# Patient Record
Sex: MALE | Race: OTHER | HISPANIC OR LATINO | URBAN - METROPOLITAN AREA
[De-identification: names, ages, dates, MRNs, and addresses within clinical notes are randomized per-mention and may not be internally consistent; named-entity substitution may affect disease eponyms.]

---

## 2023-06-07 ENCOUNTER — EMERGENCY (EMERGENCY)
Facility: HOSPITAL | Age: 28
LOS: 0 days | Discharge: HOME | End: 2023-06-07
Attending: EMERGENCY MEDICINE
Payer: MEDICAID

## 2023-06-07 VITALS
SYSTOLIC BLOOD PRESSURE: 130 MMHG | RESPIRATION RATE: 18 BRPM | DIASTOLIC BLOOD PRESSURE: 76 MMHG | HEART RATE: 58 BPM | WEIGHT: 179.9 LBS | OXYGEN SATURATION: 99 % | HEIGHT: 69 IN | TEMPERATURE: 99 F
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
## 2023-06-07 DIAGNOSIS — T16.1XXA FOREIGN BODY IN RIGHT EAR, INITIAL ENCOUNTER: ICD-10-CM
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
## 2023-06-07 PROCEDURE — 99283 EMERGENCY DEPT VISIT LOW MDM: CPT | Mod: 25

## 2023-06-07 PROCEDURE — 69200 CLEAR OUTER EAR CANAL: CPT | Mod: RT

## 2023-06-07 PROCEDURE — 69200 CLEAR OUTER EAR CANAL: CPT

## 2023-06-07 PROCEDURE — 99282 EMERGENCY DEPT VISIT SF MDM: CPT | Mod: 25

## 2023-06-07 NOTE — ED ADULT NURSE NOTE - NSFALLUNIVINTERV_ED_ALL_ED
Bed/Stretcher in lowest position, wheels locked, appropriate side rails in place/Call bell, personal items and telephone in reach/Instruct patient to call for assistance before getting out of bed/chair/stretcher/Non-slip footwear applied when patient is off stretcher/[place] to call system/Physically safe environment - no spills, clutter or unnecessary equipment/Purposeful proactive rounding/Room/bathroom lighting operational, light cord in reach

## 2023-06-07 NOTE — ED STATDOCS - NSFOLLOWUPINSTRUCTIONS_ED_ALL_ED_FT
Do not place anything in the ear.   Take motrin or tylenol for pain.     Return to the Emergency Department for worsening or persistent symptoms, and/or ANY NEW OR CONCERNING SYMPTOMS. If you have issues obtaining follow up, please call: [phone] or [phone]  to obtain a doctor or specialist who takes your insurance in your area.

## 2023-06-07 NOTE — ED STATDOCS - PATIENT PORTAL LINK FT
You can access the FollowMyHealth Patient Portal offered by [facility] by registering at the following website: [email or web address]. By joining [employer]’s FollowMyHealth portal, you will also be able to view your health information using other applications (apps) compatible with our system.

## 2023-06-07 NOTE — ED STATDOCS - PHYSICAL EXAMINATION
Gen: Well appearing in NAD  Head: NC/AT, +Bug in R ear canal  Neck: trachea midline  Resp:  No distress  Ext: no deformities  Neuro:  A&O appears non focal  Skin:  Warm and dry as visualized  Psych:  Normal affect and mood

## 2023-06-07 NOTE — ED STATDOCS - PROGRESS NOTE DETAILS
26 yo male presents with R ear pain since this morning. Pt thinks it was a bug that went inside and tried to use a paper clip to remove it without relief.   Irrigation, suction and alligator forceps used to attempt to remove the bug with success.   Will d/c home. -Julien Flores PA-C

## 2023-06-07 NOTE — ED STATDOCS - NS ED ATTENDING STATEMENT MOD
This was a shared visit with the [facility]. I reviewed and verified the documentation and independently performed the documented:

## 2023-06-07 NOTE — ED STATDOCS - OBJECTIVE STATEMENT
28 y/o male w// no pertinent PMHx presents to the ED c/o a bug in his R ear since this morning. Pt states he tried to get it out with a paper clip PTA.

## 2024-06-11 ENCOUNTER — EMERGENCY (EMERGENCY)
Facility: HOSPITAL | Age: 29
LOS: 0 days | Discharge: HOME | End: 2024-06-11
Attending: EMERGENCY MEDICINE
Payer: MEDICAID

## 2024-06-11 VITALS
RESPIRATION RATE: 16 BRPM | TEMPERATURE: 98 F | SYSTOLIC BLOOD PRESSURE: 137 MMHG | HEART RATE: 59 BPM | DIASTOLIC BLOOD PRESSURE: 63 MMHG | OXYGEN SATURATION: 100 %

## 2024-06-11 VITALS — WEIGHT: 213.63 LBS

## 2024-06-11 DIAGNOSIS — M25.529 PAIN IN UNSPECIFIED ELBOW: ICD-10-CM

## 2024-06-11 DIAGNOSIS — S09.90XA UNSPECIFIED INJURY OF HEAD, INITIAL ENCOUNTER: ICD-10-CM

## 2024-06-11 DIAGNOSIS — M54.2 CERVICALGIA: ICD-10-CM
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
## 2024-06-11 DIAGNOSIS — R11.2 NAUSEA WITH VOMITING, UNSPECIFIED: ICD-10-CM

## 2024-06-11 DIAGNOSIS — S00.01XA ABRASION OF SCALP, INITIAL ENCOUNTER: ICD-10-CM
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
## 2024-06-11 PROCEDURE — 99284 EMERGENCY DEPT VISIT MOD MDM: CPT

## 2024-06-11 PROCEDURE — 99284 EMERGENCY DEPT VISIT MOD MDM: CPT | Mod: 25

## 2024-06-11 PROCEDURE — 70450 CT HEAD/BRAIN W/O DYE: CPT | Mod: MC

## 2024-06-11 PROCEDURE — 70450 CT HEAD/BRAIN W/O DYE: CPT | Mod: 26,MC

## 2024-06-11 PROCEDURE — 72125 CT NECK SPINE W/O DYE: CPT | Mod: 26,MC

## 2024-06-11 PROCEDURE — 72125 CT NECK SPINE W/O DYE: CPT | Mod: MC

## 2024-06-11 RX ORDER — ACETAMINOPHEN 500 MG
1000 TABLET ORAL ONCE
Refills: 0 | Status: COMPLETED | OUTPATIENT
Start: 2024-06-11 | End: 2024-06-11

## 2024-06-11 RX ADMIN — Medication 1000 MILLIGRAM(S): at 21:01

## 2024-06-11 NOTE — ED STATDOCS - CLINICAL SUMMARY MEDICAL DECISION MAKING FREE TEXT BOX
Patient with fall from ladder with head and neck pain will CT head rule out bleed will CT neck rule out fracture pain control reassess.  Will update tetanus. Patient with fall from ladder with head and neck pain will CT head rule out bleed will CT neck rule out fracture pain control reassess.  Will update tetanus.    Progress note 11:10 PM CT without acute findings okay for discharge.  Pain improved.

## 2024-06-11 NOTE — ED ADULT TRIAGE NOTE - CHIEF COMPLAINT QUOTE
Ambulatory to triage after falling off a ladder about 7 feet this morning at 11am. hit top of head, has a laceration on top of head, has right upper arm abrasions, c/o a little pain to left elbow. worst is the head pain and neck pain. Has been ambulating since the fall

## 2024-06-11 NOTE — ED STATDOCS - PATIENT PORTAL LINK FT
You can access the FollowMyHealth Patient Portal offered by [facility] by registering at the following website: [email or web address]. By joining [employer]’s FollowMyHealth portal, you will also be able to view your health information using other applications (apps) compatible with our system.

## 2024-06-11 NOTE — ED STATDOCS - PHYSICAL EXAMINATION
GEN - NAD; well appearing; A+O x3  HEAD -abrasion to top of scalp   Neck–lower cervical spine tenderness palpation  EYES - EOMI, no conjunctival pallor, no scleral icterus  ENT -   mucous membranes  moist , no discharge  PULM - CTA b/l,  symmetric breath sounds  COR -  RRR, S1 S2, no murmurs  ABD - ND, NT, soft, no guarding, no rebound, no masses    EXTREMS -no edema, no deformity, warm and well perfused   SKIN - no rash or bruising      NEUROLOGIC - alert, sensation nl, motor 5/5 RUE/LUE/RLE/LLE

## 2024-06-11 NOTE — ED STATDOCS - NSFOLLOWUPINSTRUCTIONS_ED_ALL_ED_FT
Please take 600 mg ibuprofen every 6 hours for pain.  Return to ED if worsening pain or other concerning symptoms.  If you continue to have persistent headaches please follow-up with concussion clinic.

## 2024-06-11 NOTE — ED ADULT NURSE NOTE - NSFALLHARMRISKINTERV_ED_ALL_ED

## 2024-06-11 NOTE — ED ADULT NURSE NOTE - OBJECTIVE STATEMENT
28y male AAOx3 ambulatory presents to ED s/p fall. Pt reports mechanical fall off a ladder around 11AM, -LOC. Pt was ambulatory at the scene and ambulatory in ED. Complains of head and neck pain, pt also +abrasions to left elbow. Pt c/o intermittent lightheadedness and mild nausea. Denies chest pain, difficulty breathing, vomiting. Medicated as per MD order. Respirations are even and unlabored, in NAD.